# Patient Record
Sex: MALE | URBAN - METROPOLITAN AREA
[De-identification: names, ages, dates, MRNs, and addresses within clinical notes are randomized per-mention and may not be internally consistent; named-entity substitution may affect disease eponyms.]

---

## 2024-07-19 ENCOUNTER — TELEPHONE (OUTPATIENT)
Age: 54
End: 2024-07-19

## 2024-07-19 NOTE — TELEPHONE ENCOUNTER
PSR called and lvm asking for the patient to call our office back to let us know the reason for his appointment.

## 2024-10-30 ENCOUNTER — TELEPHONE (OUTPATIENT)
Age: 54
End: 2024-10-30

## 2024-10-30 NOTE — TELEPHONE ENCOUNTER
Patient called in to verify his insurance for his upcoming appointment tomorrow.    PSR has entered it and verified the insurance.    Patient is also requesting to be seen at the [place] location going forward as it is closer to him.

## 2024-10-31 ENCOUNTER — OFFICE VISIT (OUTPATIENT)
Age: 54
End: 2024-10-31
Payer: COMMERCIAL

## 2024-10-31 VITALS
OXYGEN SATURATION: 98 % | HEART RATE: 74 BPM | HEIGHT: 72 IN | WEIGHT: 228 LBS | SYSTOLIC BLOOD PRESSURE: 140 MMHG | BODY MASS INDEX: 30.88 KG/M2 | DIASTOLIC BLOOD PRESSURE: 88 MMHG

## 2024-10-31 DIAGNOSIS — R25.3 MUSCLE TWITCHING: Primary | ICD-10-CM

## 2024-10-31 PROCEDURE — 99204 OFFICE O/P NEW MOD 45 MIN: CPT | Performed by: PSYCHIATRY & NEUROLOGY

## 2024-10-31 RX ORDER — CARVEDILOL 25 MG/1
TABLET ORAL
COMMUNITY
Start: 2024-10-09

## 2024-10-31 RX ORDER — FUROSEMIDE 20 MG/1
TABLET ORAL
COMMUNITY
Start: 2024-09-25

## 2024-10-31 RX ORDER — POTASSIUM CHLORIDE 1.5 G/1.58G
20 POWDER, FOR SOLUTION ORAL 2 TIMES DAILY
COMMUNITY

## 2024-10-31 RX ORDER — IRBESARTAN 300 MG/1
TABLET ORAL
COMMUNITY
Start: 2024-09-25

## 2024-10-31 RX ORDER — ISOSORBIDE DINITRATE 20 MG/1
20 TABLET ORAL 3 TIMES DAILY
COMMUNITY
Start: 2024-07-25

## 2024-10-31 RX ORDER — HYDRALAZINE HYDROCHLORIDE 50 MG/1
TABLET, FILM COATED ORAL
COMMUNITY
Start: 2024-10-24

## 2024-10-31 RX ORDER — CLONIDINE HYDROCHLORIDE 0.1 MG/1
TABLET ORAL
COMMUNITY
Start: 2024-10-09

## 2024-10-31 RX ORDER — AMLODIPINE BESYLATE 10 MG/1
TABLET ORAL
COMMUNITY
Start: 2024-10-24

## 2024-10-31 NOTE — PROGRESS NOTES
Chief Complaint   Patient presents with    Neurologic Problem     \"Spasms and tremors in my hands, feet and face, that started after Shingrix vaccine last year, and worsened about a month after receiving it.\"     Vitals:    10/31/24 0810   BP: (!) 140/88   Pulse: 74   SpO2: 98%       
packet Take 20 mEq by mouth 2 times daily    Dapagliflozin Prop-metFORMIN (XIGDUO XR PO) Take by mouth    Fluticasone Propionate (FLONASE NA) by Nasal route     No current facility-administered medications for this visit.     No Known Allergies  No family history on file.  Social History     Tobacco Use    Smoking status: Never     Passive exposure: Never    Smokeless tobacco: Never   Substance Use Topics    Alcohol use: Not Currently    Drug use: Never     No past surgical history on file.      REVIEW OF SYSTEMS  Review of Systems - History obtained from the patient  Psychological ROS: negative  ENT ROS: negative  Hematological and Lymphatic ROS: negative  Endocrine ROS: negative  Respiratory ROS: no cough, shortness of breath, or wheezing  Cardiovascular ROS: no chest pain or dyspnea on exertion  Gastrointestinal ROS: no abdominal pain, change in bowel habits, or black or bloody stools  Genito-Urinary ROS: no dysuria, trouble voiding, or hematuria  Musculoskeletal ROS: negative  Dermatological ROS: negative      PHYSICAL EXAMINATION:    Vitals:    10/31/24 0810   BP: (!) 140/88   Pulse: 74   SpO2: 98%     General:  Well groomed individual in no acute distress.    Neck: Supple, nontender, no bruits, no pain with resistance to active range of motion.    Heart: Regular rate and rhythm.  Normal S1S2.  Lungs:  Equal chest expansion, no cough, no wheeze  Musculoskeletal:  Extremities revealed no edema and had full range of motion of joints.    Psych:  Good mood and bright affect    NEUROLOGICAL EXAMINATION:     Mental Status:   Alert and oriented to person, place, and time with recent and remote memory intact.  Attention span and concentration are normal. Speech is fluent.      Cranial Nerves:    II, III, IV, VI:  Visual acuity grossly intact. Visual fields are normal.    Pupils are equal, round, and reactive to light.    Extra-ocular movements are full and fluid.  Fundoscopic exam was benign, no ptosis or nystagmus.

## 2024-11-05 ENCOUNTER — TELEPHONE (OUTPATIENT)
Age: 54
End: 2024-11-05

## 2024-11-06 NOTE — TELEPHONE ENCOUNTER
Patient is requesting to schedule EMG at [facility] since it's closer to home.    Please contact

## 2024-11-22 ENCOUNTER — PROCEDURE VISIT (OUTPATIENT)
Age: 54
End: 2024-11-22

## 2024-11-22 DIAGNOSIS — R25.3 MUSCLE TWITCHING: Primary | ICD-10-CM

## 2024-11-22 NOTE — PROGRESS NOTES
lumbosacral motor radiculopathy.    No fasciculations noted on needle examination.        Justin Oscar MD  Diplomate, American Board of Psychiatry and Neurology  Diplomate, Neuromuscular Medicine  Diplomate, American Board of Electrodiagnostic Medicine  Director, [facility] Accredited Laboratory with Exemplary Status           Nerve Conduction Studies  Anti Sensory Summary Table     Stim Site NR Peak (ms) Norm Peak (ms) P-T Amp (µV) Norm P-T Amp Site1 Site2 Dist (cm)   Left Sup Fibular Anti Sensory (Lat ankle)  30.8 °C   Lower leg    2.8 <4.5 11.1 >5 Lower leg Lat ankle 10.0   Right Sup Fibular Anti Sensory (Lat ankle)  31.2 °C   Lower leg    2.8 <4.5 9.8 >5 Lower leg Lat ankle 10.0   Left Sural Anti Sensory (Lat Mall)  23.9 °C   Calf    3.6 <4.5 9.7 >4.0 Calf Lat Mall 14.0   Right Sural Anti Sensory (Lat Mall)  31.3 °C   Calf    3.8 <4.5 6.1 >4.0 Calf Lat Mall 14.0     Motor Summary Table     Stim Site NR Onset (ms) Norm Onset (ms) O-P Amp (mV) Norm O-P Amp Amp (Prev) (%) Site1 Site2 Dist (cm) Aj (m/s) Norm Aj (m/s)   Right Fibular Motor (Ext Dig Brev)  31.1 °C   Ankle    3.8 <6.5 3.3 >2.6 100.0 Ankle Ext Dig Brev 8.0     B Fib    12.2  3.9  118.2 B Fib Ankle 36.0 43 >38   Poplt    14.5  3.5  89.7 Poplt B Fib 10.0 43 >42   Left Tibial Motor (Abd Gauthier Brev)  24.2 °C   Ankle    5.0 <6.1 5.6 >5.3 100.0 Ankle Abd Gauthier Brev 8.0     Knee    14.1  3.8  67.9 Knee Ankle 36.0 40 >39   Right Tibial Motor (Abd Gauthier Brev)  31.5 °C   Ankle    3.9 <6.1 7.9 >5.3 100.0 Ankle Abd Gauthier Brev 8.0     Knee    14.3  6.5  82.3 Knee Ankle 0.0  >39     F Wave Studies     NR F-Lat (ms) Lat Norm (ms) L-R F-Lat (ms) L-R Lat Norm   Left Tibial (Mrkrs) (Abd Hallucis)  31.1 °C      54.27 <56 1.29 <5.7   Right Tibial (Mrkrs) (Abd Hallucis)  22.8 °C      55.56 <56 1.29 <5.7     H Reflex Studies     NR H-Lat (ms) L-R H-Lat (ms) L-R Lat Norm   Left Tibial (Gastroc)  30.8 °C      32.27 1.55 <2.0   Right Tibial (Gastroc)  31.5 °C      30.73 1.55 <2.0